# Patient Record
Sex: MALE | Race: WHITE | Employment: FULL TIME | URBAN - METROPOLITAN AREA
[De-identification: names, ages, dates, MRNs, and addresses within clinical notes are randomized per-mention and may not be internally consistent; named-entity substitution may affect disease eponyms.]

---

## 2017-01-16 DIAGNOSIS — IMO0001 UNCONTROLLED TYPE 2 DIABETES MELLITUS WITHOUT COMPLICATION, WITHOUT LONG-TERM CURRENT USE OF INSULIN: Primary | ICD-10-CM

## 2017-01-17 RX ORDER — GLIPIZIDE 5 MG/1
TABLET, FILM COATED, EXTENDED RELEASE ORAL
Qty: 90 TABLET | Refills: 0 | OUTPATIENT
Start: 2017-01-17

## 2017-01-26 DIAGNOSIS — IMO0001 UNCONTROLLED TYPE 2 DIABETES MELLITUS WITHOUT COMPLICATION, WITHOUT LONG-TERM CURRENT USE OF INSULIN: ICD-10-CM

## 2017-01-26 DIAGNOSIS — Z76.0 MEDICATION REFILL: Primary | ICD-10-CM

## 2017-01-26 DIAGNOSIS — F41.0 PANIC DISORDER WITHOUT AGORAPHOBIA: ICD-10-CM

## 2017-01-27 RX ORDER — BUSPIRONE HYDROCHLORIDE 5 MG/1
TABLET ORAL
Qty: 90 TABLET | Refills: 1 | Status: SHIPPED | OUTPATIENT
Start: 2017-01-27 | End: 2017-07-24

## 2017-01-27 RX ORDER — GLIPIZIDE 10 MG/1
TABLET ORAL
Qty: 180 TABLET | Refills: 1 | Status: SHIPPED | OUTPATIENT
Start: 2017-01-27 | End: 2017-07-24

## 2017-01-27 NOTE — TELEPHONE ENCOUNTER
Requesting GLIPIZIDE 10 MG TABLET & BUSPIRONE HCL 5 MG TABLET  LOV: 12/8/2016  Last Labs: Diabetic A1C: 12/3/2016 and future order pended. Filled: 8/4/2016 #180 with 1 refills for glipizide.               Buspirone medication filled: 8/4/16 #90 with 1 refi

## 2017-02-06 DIAGNOSIS — E78.1 HIGH TRIGLYCERIDES: Primary | ICD-10-CM

## 2017-02-06 RX ORDER — FENOFIBRATE 145 MG/1
TABLET, COATED ORAL
Qty: 90 TABLET | Refills: 0 | Status: SHIPPED | OUTPATIENT
Start: 2017-02-06 | End: 2017-05-08

## 2017-02-20 ENCOUNTER — MED REC SCAN ONLY (OUTPATIENT)
Dept: FAMILY MEDICINE CLINIC | Facility: CLINIC | Age: 42
End: 2017-02-20

## 2017-04-07 ENCOUNTER — OFFICE VISIT (OUTPATIENT)
Dept: FAMILY MEDICINE CLINIC | Facility: CLINIC | Age: 42
End: 2017-04-07

## 2017-04-07 VITALS
TEMPERATURE: 99 F | WEIGHT: 247 LBS | HEART RATE: 91 BPM | DIASTOLIC BLOOD PRESSURE: 76 MMHG | BODY MASS INDEX: 35.36 KG/M2 | OXYGEN SATURATION: 97 % | SYSTOLIC BLOOD PRESSURE: 106 MMHG | HEIGHT: 70 IN | RESPIRATION RATE: 18 BRPM

## 2017-04-07 DIAGNOSIS — J01.00 ACUTE MAXILLARY SINUSITIS, RECURRENCE NOT SPECIFIED: Primary | ICD-10-CM

## 2017-04-07 PROCEDURE — 99213 OFFICE O/P EST LOW 20 MIN: CPT | Performed by: NURSE PRACTITIONER

## 2017-04-07 RX ORDER — AMOXICILLIN AND CLAVULANATE POTASSIUM 875; 125 MG/1; MG/1
1 TABLET, FILM COATED ORAL 2 TIMES DAILY
Qty: 20 TABLET | Refills: 0 | Status: SHIPPED | OUTPATIENT
Start: 2017-04-07 | End: 2017-04-17

## 2017-04-07 NOTE — PROGRESS NOTES
CHIEF COMPLAINT:   Patient presents with:  Sinus Problem: RM17, x cough, chest congestion, x 2-3 days      HPI:   Tilton Rubinstein is a 35year old male who presents for cold symptoms for  3  days.  Symptoms have progressed into sinus congestion and been wor • Pure hyperglyceridemia    • Mole of skin      left back   • Head trauma      closed   • Cephalgia      post trauma   • Otalgia      left   • Family history of diabetes mellitus    • Obesity    • Hypertriglyceridemia    • Lipid screening 06/25/11   • Polly Cohen LUNGS: clear to auscultation bilaterally, no wheezes or rhonchi. Breathing is non labored. CARDIO: RRR without murmur  EXTREMITIES: no cyanosis, clubbing or edema  LYMPH:  + anterior cervical lymphadenopathy.         ASSESSMENT AND PLAN:   Мария Henderson Applying heat to the area surrounding your sinuses may make you feel more comfortable. Use a hot water bottle or a hand towel dipped in hot water. Some people also find ice packs effective for relieving pain.   Medicines  Your doctor may prescribe medicatio

## 2017-05-09 RX ORDER — FENOFIBRATE 145 MG/1
TABLET, COATED ORAL
Qty: 30 TABLET | Refills: 0 | Status: SHIPPED | OUTPATIENT
Start: 2017-05-09 | End: 2017-06-08

## 2017-05-09 NOTE — TELEPHONE ENCOUNTER
Please call pt and let him know his lipid panel is overdue. Lipid panel is placed in system. Will refill his medication for 30 days until he comes in for his June appt.           Future Appointments  Date Time Provider Coleman Taylor   6/8/2017 6:30 PM

## 2017-06-03 ENCOUNTER — APPOINTMENT (OUTPATIENT)
Dept: LAB | Age: 42
End: 2017-06-03
Attending: FAMILY MEDICINE
Payer: COMMERCIAL

## 2017-06-03 DIAGNOSIS — IMO0001 UNCONTROLLED TYPE 2 DIABETES MELLITUS WITHOUT COMPLICATION, WITHOUT LONG-TERM CURRENT USE OF INSULIN: ICD-10-CM

## 2017-06-03 DIAGNOSIS — E78.1 HIGH TRIGLYCERIDES: ICD-10-CM

## 2017-06-03 PROCEDURE — 36415 COLL VENOUS BLD VENIPUNCTURE: CPT | Performed by: FAMILY MEDICINE

## 2017-06-03 PROCEDURE — 80061 LIPID PANEL: CPT | Performed by: FAMILY MEDICINE

## 2017-06-03 PROCEDURE — 83036 HEMOGLOBIN GLYCOSYLATED A1C: CPT | Performed by: FAMILY MEDICINE

## 2017-06-06 NOTE — PROGRESS NOTES
Quick Note:    Pt has 6/8/17 office visit this week and his glyco is improved at 8.8%. Lipids improved as well.  Dr. Michael Quinn    ______

## 2017-06-08 ENCOUNTER — OFFICE VISIT (OUTPATIENT)
Dept: FAMILY MEDICINE CLINIC | Facility: CLINIC | Age: 42
End: 2017-06-08

## 2017-06-08 VITALS
SYSTOLIC BLOOD PRESSURE: 118 MMHG | RESPIRATION RATE: 16 BRPM | WEIGHT: 243 LBS | HEART RATE: 102 BPM | TEMPERATURE: 98 F | OXYGEN SATURATION: 99 % | BODY MASS INDEX: 34.79 KG/M2 | HEIGHT: 70 IN | DIASTOLIC BLOOD PRESSURE: 70 MMHG

## 2017-06-08 DIAGNOSIS — C67.9 MALIGNANT NEOPLASM OF URINARY BLADDER, UNSPECIFIED SITE (HCC): ICD-10-CM

## 2017-06-08 DIAGNOSIS — E78.2 MIXED DYSLIPIDEMIA: ICD-10-CM

## 2017-06-08 DIAGNOSIS — Z13.89 SCREENING FOR DIABETIC PERIPHERAL NEUROPATHY: ICD-10-CM

## 2017-06-08 DIAGNOSIS — IMO0001 UNCONTROLLED TYPE 2 DIABETES MELLITUS WITHOUT COMPLICATION, WITHOUT LONG-TERM CURRENT USE OF INSULIN: Primary | ICD-10-CM

## 2017-06-08 DIAGNOSIS — Z13.31 DEPRESSION SCREEN: ICD-10-CM

## 2017-06-08 DIAGNOSIS — Z71.3 DIETARY COUNSELING: ICD-10-CM

## 2017-06-08 DIAGNOSIS — F41.1 GENERALIZED ANXIETY DISORDER: ICD-10-CM

## 2017-06-08 DIAGNOSIS — R73.09 ELEVATED GLYCOHEMOGLOBIN: ICD-10-CM

## 2017-06-08 DIAGNOSIS — C67.4 MALIGNANT NEOPLASM OF POSTERIOR WALL OF URINARY BLADDER (HCC): ICD-10-CM

## 2017-06-08 PROCEDURE — 99214 OFFICE O/P EST MOD 30 MIN: CPT | Performed by: FAMILY MEDICINE

## 2017-06-08 RX ORDER — FENOFIBRATE 145 MG/1
145 TABLET, COATED ORAL
Qty: 90 TABLET | Refills: 1 | Status: SHIPPED | OUTPATIENT
Start: 2017-06-08 | End: 2017-11-15

## 2017-06-08 NOTE — PROGRESS NOTES
HPI:   Sadie Anna is a 35year old male who presents for recheck of his diabetes type II NIDDM, bladder cancer follow up, discussion of dyslipidemia from 6/3/17 labs, and need for med refill for Fenofibrate.    Patient’s FBS have been improved at 140s good today with less personal and less work stress. Wt Readings from Last 6 Encounters:  04/07/17 : 247 lb  12/15/16 : 245 lb  12/08/16 : 247 lb  10/11/16 : 246 lb  09/29/16 : 239 lb 6 oz  08/04/16 : 246 lb    Body mass index is 34.87 kg/(m^2).        L rhinitis, cause unspecified    • Panic disorder without agoraphobia    • Other acquired absence of organ    • Other and unspecified hyperlipidemia    • Pure hyperglyceridemia    • Seasonal allergies    • Bell's palsy      left   • Hematuria    • Left flank no apparent distress, athletic male   SKIN: no rashes,no suspicious lesions, tan   NECK: supple,no adenopathy,no bruits  LUNGS: clear to auscultation, exhalation smells like cigarette smoke   CARDIO: RRR without murmur  GI: good BS's,no masses, HSM or tend today with no deficiencies     Depression screen  PHQ2 is zero    Dietary counseling  Healthy DM low cholesterol diet reviewed     Mixed dyslipidemia  -     Fenofibrate 145 MG Oral Tab; Take 1 tablet (145 mg total) by mouth once daily.   Refill given    Lavinia

## 2017-06-08 NOTE — PATIENT INSTRUCTIONS
Diet: Diabetes  Food is an important tool that you can use to control diabetes and stay healthy. Eating well-balanced meals in the correct amounts will help you control your blood glucose levels and prevent low blood sugar reactions.  It will also help yo · Avoid added salt. It can contribute to high blood pressure, which can cause heart disease. People with diabetes already have a risk of high blood pressure and heart disease. · Stay at a healthy weight.  If you need to lose weight, cut down on your portio You will be asked about your overall health and any history of foot problems. You’ll also discuss your diabetes history, such as whether your blood sugar level has changed over time.  It also includes questions about sensations of pain, tingling, pins and n When you have diabetes, it’s easier to prevent problems than to treat them later on. So see your healthcare team for regular checkups and foot care. Your healthcare team can also help you learn more about caring for your feet at home.  For example, you may

## 2017-06-08 NOTE — PROGRESS NOTES
Quick Note:    Pt aware of elevated microalbumin level due to elevated glycohemoglobin.  Dr. Diego Madsen    ______

## 2017-06-27 DIAGNOSIS — Z76.0 MEDICATION REFILL: ICD-10-CM

## 2017-06-27 DIAGNOSIS — IMO0001 UNCONTROLLED TYPE 2 DIABETES MELLITUS WITHOUT COMPLICATION, WITHOUT LONG-TERM CURRENT USE OF INSULIN: ICD-10-CM

## 2017-06-27 NOTE — TELEPHONE ENCOUNTER
METFORMIN HCL 1,000 MG TABLET      Diabetic Medication Protocol Failed6/27 1:03 AM   Last HgBA1C < 7.5     Future Appointments  Date Time Provider Coleman Taylor   12/7/2017 6:30 PM Nathalia Sullivan,  [facility] [address] [facility] [address]     Medication pended if okay

## 2017-07-19 ENCOUNTER — OFFICE VISIT (OUTPATIENT)
Dept: FAMILY MEDICINE CLINIC | Facility: CLINIC | Age: 42
End: 2017-07-19

## 2017-07-19 VITALS
BODY MASS INDEX: 34 KG/M2 | WEIGHT: 235 LBS | HEART RATE: 90 BPM | RESPIRATION RATE: 16 BRPM | TEMPERATURE: 98 F | DIASTOLIC BLOOD PRESSURE: 72 MMHG | SYSTOLIC BLOOD PRESSURE: 128 MMHG | OXYGEN SATURATION: 96 %

## 2017-07-19 DIAGNOSIS — J01.00 ACUTE NON-RECURRENT MAXILLARY SINUSITIS: Primary | ICD-10-CM

## 2017-07-19 DIAGNOSIS — R06.2 WHEEZING: ICD-10-CM

## 2017-07-19 PROCEDURE — 99213 OFFICE O/P EST LOW 20 MIN: CPT | Performed by: NURSE PRACTITIONER

## 2017-07-19 RX ORDER — ALBUTEROL SULFATE 90 UG/1
2 AEROSOL, METERED RESPIRATORY (INHALATION) EVERY 4 HOURS PRN
Qty: 1 INHALER | Refills: 1 | Status: SHIPPED | OUTPATIENT
Start: 2017-07-19 | End: 2018-11-26

## 2017-07-19 RX ORDER — AMOXICILLIN AND CLAVULANATE POTASSIUM 875; 125 MG/1; MG/1
1 TABLET, FILM COATED ORAL 2 TIMES DAILY
Qty: 20 TABLET | Refills: 0 | Status: SHIPPED | OUTPATIENT
Start: 2017-07-19 | End: 2017-07-29

## 2017-07-19 RX ORDER — CODEINE PHOSPHATE AND GUAIFENESIN 10; 100 MG/5ML; MG/5ML
10 SOLUTION ORAL NIGHTLY PRN
Qty: 120 ML | Refills: 0 | Status: SHIPPED | OUTPATIENT
Start: 2017-07-19 | End: 2017-12-07

## 2017-07-19 NOTE — PATIENT INSTRUCTIONS
Use Claritin 10mg daily, ongoing  Use the medications prescribed today      When to Use Antibiotics   Antibiotics are medicines used to treat infections caused by bacteria. They don’t work for illnesses caused by viruses or an allergic reaction.  In fact, t · Most sinus infections (sinusitis). This kind of infection causes sinus pain and swelling, and a runny nose. In most cases, sinusitis goes away on its own, and antibiotics don’t make recovery quicker. · Allergic rhinitis.  This is a set of symptoms caused · Use over-the-counter medicine such as acetaminophen to ease pain or fever, as directed by your healthcare provider.   To treat sinus pain or nasal congestion:   · Put a warm, moist washcloth on your face where you feel sinus pain or pressure.   · Use a na Air circulates freely though healthy sinuses. Tiny, hairlike structures called cilia line the sinuses. Cilia move the thin, watery mucus through the sinuses and into the nose. Sinuses are healthy when they drain freely.  Sinus drainage can be blocked if the

## 2017-07-19 NOTE — PROGRESS NOTES
CHIEF COMPLAINT:   Patient presents with:  Nasal Congestion: coughing, sinus pressure, H/A -- sxs for over 1 week      HPI:   Vincent Moran is a 35year old male who presents for upper respiratory symptoms for  7 days.  Patient reports sore throat only at • Renal lithiasis    • Seasonal allergies    • Sebaceous cyst    • Type II or unspecified type diabetes mellitus without mention of complication, not stated as uncontrolled    • Weight loss       Past Surgical History:  2004: CHOLECYSTECTOMY  No date: ORAL PLAN: Meds as below.   Comfort care as described in Patient Instructions    Meds & Refills for this Visit:    Signed Prescriptions Disp Refills    Amoxicillin-Pot Clavulanate 875-125 MG Oral Tab 20 tablet 0      Sig: Take 1 tablet by mouth 2 (two) times ivy · Bronchitis. This is an infection in the lungs most often caused by a virus. You may have coughing, phlegm, body aches, and a low fever. A common type of bronchitis is known as a chest cold (acute bronchitis).  This often happens after you have a respirato · Some ear infections. In some cases, a healthcare provider may prescribe antibiotics for an ear infection. You may need a test to show what’s causing the ear infection. · Some sinus infections.  In some cases, yourhealthcare provider may give you antibiot Your sinuses are air-filled spaces between the bones in your head. They have small openings that connect to the nasal cavity. The sinuses make mucus that drains into the nose.  This helps keep the nose moist and free of dust and germs.            Parts of t

## 2017-07-24 DIAGNOSIS — IMO0001 UNCONTROLLED TYPE 2 DIABETES MELLITUS WITHOUT COMPLICATION, WITHOUT LONG-TERM CURRENT USE OF INSULIN: Primary | ICD-10-CM

## 2017-07-24 DIAGNOSIS — F41.0 PANIC DISORDER WITHOUT AGORAPHOBIA: ICD-10-CM

## 2017-07-24 DIAGNOSIS — Z76.0 MEDICATION REFILL: ICD-10-CM

## 2017-07-24 RX ORDER — BUSPIRONE HYDROCHLORIDE 5 MG/1
TABLET ORAL
Qty: 90 TABLET | Refills: 1 | Status: SHIPPED | OUTPATIENT
Start: 2017-07-24 | End: 2018-01-20

## 2017-07-24 RX ORDER — GLIPIZIDE 10 MG/1
TABLET ORAL
Qty: 180 TABLET | Refills: 1 | Status: SHIPPED | OUTPATIENT
Start: 2017-07-24 | End: 2018-01-20

## 2017-07-24 NOTE — TELEPHONE ENCOUNTER
GLIPIZIDE 10 MG TABLET  In chart as: GLIPIZIDE 10 MG Oral Tab  TAKE 1 TABLET (10 MG TOTAL) BY MOUTH 2 (TWO) TIMES DAILY BEFORE MEALS.        Disp: 180 tablet Refills: 1    Class: Normal Start: 7/24/2017   For: Medication refill; Uncontrolled type 2 diabetes

## 2017-09-25 ENCOUNTER — MED REC SCAN ONLY (OUTPATIENT)
Dept: FAMILY MEDICINE CLINIC | Facility: CLINIC | Age: 42
End: 2017-09-25

## 2017-11-15 ENCOUNTER — TELEPHONE (OUTPATIENT)
Dept: FAMILY MEDICINE CLINIC | Facility: CLINIC | Age: 42
End: 2017-11-15

## 2017-11-15 DIAGNOSIS — IMO0001 UNCONTROLLED TYPE 2 DIABETES MELLITUS WITHOUT COMPLICATION, WITHOUT LONG-TERM CURRENT USE OF INSULIN: ICD-10-CM

## 2017-11-15 DIAGNOSIS — Z76.0 MEDICATION REFILL: ICD-10-CM

## 2017-11-15 DIAGNOSIS — E78.2 MIXED DYSLIPIDEMIA: ICD-10-CM

## 2017-11-15 RX ORDER — FENOFIBRATE 145 MG/1
145 TABLET, COATED ORAL
Qty: 90 TABLET | Refills: 0 | Status: SHIPPED | OUTPATIENT
Start: 2017-11-15 | End: 2018-03-12

## 2017-11-15 NOTE — TELEPHONE ENCOUNTER
METFORMIN HCL 1,000 MG TABLET  Will file in chart as: METFORMIN HCL 1000 MG Oral Tab  TAKE 1 TABLET TWICE A DAY WITH FOOD       Disp: 60 tablet Refills: 1    Class: Normal Start: 11/15/2017   For: Uncontrolled type 2 diabetes mellitus without complication,

## 2017-11-16 ENCOUNTER — OFFICE VISIT (OUTPATIENT)
Dept: FAMILY MEDICINE CLINIC | Facility: CLINIC | Age: 42
End: 2017-11-16

## 2017-11-16 VITALS
TEMPERATURE: 98 F | HEART RATE: 104 BPM | OXYGEN SATURATION: 98 % | DIASTOLIC BLOOD PRESSURE: 76 MMHG | SYSTOLIC BLOOD PRESSURE: 120 MMHG | WEIGHT: 240 LBS | BODY MASS INDEX: 34 KG/M2 | RESPIRATION RATE: 16 BRPM

## 2017-11-16 DIAGNOSIS — F17.200 TOBACCO USE DISORDER: ICD-10-CM

## 2017-11-16 DIAGNOSIS — IMO0001 UNCONTROLLED TYPE 2 DIABETES MELLITUS WITHOUT COMPLICATION, WITHOUT LONG-TERM CURRENT USE OF INSULIN: Primary | ICD-10-CM

## 2017-11-16 DIAGNOSIS — J01.00 ACUTE MAXILLARY SINUSITIS, RECURRENCE NOT SPECIFIED: ICD-10-CM

## 2017-11-16 PROCEDURE — 99213 OFFICE O/P EST LOW 20 MIN: CPT | Performed by: NURSE PRACTITIONER

## 2017-11-16 RX ORDER — ALBUTEROL SULFATE 90 UG/1
2 AEROSOL, METERED RESPIRATORY (INHALATION) EVERY 6 HOURS PRN
Qty: 1 INHALER | Refills: 0 | Status: SHIPPED | OUTPATIENT
Start: 2017-11-16 | End: 2017-12-07

## 2017-11-16 RX ORDER — CODEINE PHOSPHATE AND GUAIFENESIN 10; 100 MG/5ML; MG/5ML
SOLUTION ORAL
Qty: 100 ML | Refills: 0 | Status: SHIPPED | OUTPATIENT
Start: 2017-11-16 | End: 2017-12-07

## 2017-11-16 RX ORDER — AMOXICILLIN AND CLAVULANATE POTASSIUM 875; 125 MG/1; MG/1
1 TABLET, FILM COATED ORAL 2 TIMES DAILY
Qty: 20 TABLET | Refills: 0 | Status: SHIPPED | OUTPATIENT
Start: 2017-11-16 | End: 2017-11-26

## 2017-11-17 NOTE — PROGRESS NOTES
CHIEF COMPLAINT:   Patient presents with:  Nasal Congestion: x 6 days, coughing, chest hurts, short of breath       HPI:   Cailin Donald is a 38year old male who presents for upper respiratory symptoms for  6 days.  Patient reports congestion, yellow col Allergic rhinitis, cause unspecified    • Bell's palsy     left   • Bladder cancer [facility])    • Calculus of kidney    • Cancer ([facility] [address]    • Cephalgia     post trauma   • Esophageal reflux    • Family history of diabetes mellitus    • Fatigue    • Generalized a frontal sinuses.   EYES: conjunctiva clear, EOM intact  EARS: TM's clear, no bulging, no retraction,moderate clear right fluid, bony landmarks visible  NOSE: Nostrils patent, thick nasal discharge, nasal mucosa pink and swollen nasal turbinates  THROAT: Malissa Panning

## 2017-11-26 ENCOUNTER — APPOINTMENT (OUTPATIENT)
Dept: CT IMAGING | Age: 42
End: 2017-11-26
Attending: EMERGENCY MEDICINE
Payer: COMMERCIAL

## 2017-11-26 ENCOUNTER — HOSPITAL ENCOUNTER (EMERGENCY)
Age: 42
Discharge: HOME | End: 2017-11-26
Attending: EMERGENCY MEDICINE
Payer: COMMERCIAL

## 2017-11-26 VITALS
HEART RATE: 80 BPM | OXYGEN SATURATION: 98 % | TEMPERATURE: 98 F | BODY MASS INDEX: 34.36 KG/M2 | RESPIRATION RATE: 18 BRPM | SYSTOLIC BLOOD PRESSURE: 134 MMHG | DIASTOLIC BLOOD PRESSURE: 67 MMHG | WEIGHT: 240 LBS | HEIGHT: 70 IN

## 2017-11-26 DIAGNOSIS — K57.92 ACUTE DIVERTICULITIS: Primary | ICD-10-CM

## 2017-11-26 PROCEDURE — 96374 THER/PROPH/DIAG INJ IV PUSH: CPT

## 2017-11-26 PROCEDURE — 83690 ASSAY OF LIPASE: CPT

## 2017-11-26 PROCEDURE — 83690 ASSAY OF LIPASE: CPT | Performed by: EMERGENCY MEDICINE

## 2017-11-26 PROCEDURE — 81003 URINALYSIS AUTO W/O SCOPE: CPT | Performed by: EMERGENCY MEDICINE

## 2017-11-26 PROCEDURE — 80053 COMPREHEN METABOLIC PANEL: CPT | Performed by: EMERGENCY MEDICINE

## 2017-11-26 PROCEDURE — 99284 EMERGENCY DEPT VISIT MOD MDM: CPT

## 2017-11-26 PROCEDURE — 85025 COMPLETE CBC W/AUTO DIFF WBC: CPT | Performed by: EMERGENCY MEDICINE

## 2017-11-26 PROCEDURE — 74177 CT ABD & PELVIS W/CONTRAST: CPT | Performed by: EMERGENCY MEDICINE

## 2017-11-26 PROCEDURE — 85025 COMPLETE CBC W/AUTO DIFF WBC: CPT

## 2017-11-26 PROCEDURE — 80053 COMPREHEN METABOLIC PANEL: CPT

## 2017-11-26 PROCEDURE — 96361 HYDRATE IV INFUSION ADD-ON: CPT

## 2017-11-26 RX ORDER — LEVOFLOXACIN 500 MG/1
500 TABLET, FILM COATED ORAL ONCE
Status: COMPLETED | OUTPATIENT
Start: 2017-11-26 | End: 2017-11-26

## 2017-11-26 RX ORDER — KETOROLAC TROMETHAMINE 30 MG/ML
30 INJECTION, SOLUTION INTRAMUSCULAR; INTRAVENOUS ONCE
Status: COMPLETED | OUTPATIENT
Start: 2017-11-26 | End: 2017-11-26

## 2017-11-26 RX ORDER — HYDROCODONE BITARTRATE AND ACETAMINOPHEN 5; 325 MG/1; MG/1
1-2 TABLET ORAL EVERY 4 HOURS PRN
Qty: 20 TABLET | Refills: 0 | Status: SHIPPED | OUTPATIENT
Start: 2017-11-26 | End: 2017-12-03

## 2017-11-26 RX ORDER — CIPROFLOXACIN 500 MG/1
500 TABLET, FILM COATED ORAL 2 TIMES DAILY
Qty: 20 TABLET | Refills: 0 | Status: SHIPPED | OUTPATIENT
Start: 2017-11-26 | End: 2017-12-06

## 2017-11-26 RX ORDER — METRONIDAZOLE 500 MG/1
500 TABLET ORAL 3 TIMES DAILY
Qty: 30 TABLET | Refills: 0 | Status: SHIPPED | OUTPATIENT
Start: 2017-11-26 | End: 2017-12-06

## 2017-11-26 RX ORDER — SODIUM CHLORIDE 9 MG/ML
INJECTION, SOLUTION INTRAVENOUS CONTINUOUS
Status: DISCONTINUED | OUTPATIENT
Start: 2017-11-26 | End: 2017-11-27

## 2017-11-26 RX ORDER — METRONIDAZOLE 500 MG/1
500 TABLET ORAL ONCE
Status: COMPLETED | OUTPATIENT
Start: 2017-11-26 | End: 2017-11-26

## 2017-11-27 NOTE — ED INITIAL ASSESSMENT (HPI)
STS LLQ PAIN SINCE YEST. STS WORSENING SINCE ONSET. PT STS IDENTICAL PAIN WHEN HE WAS DIAGNOSED WITH DIVERTICULITIS. PT DENIES N/V/D.

## 2017-11-27 NOTE — ED PROVIDER NOTES
Patient Seen in: [facility] Emergency Department In [place]    History   Patient presents with:  Abdomen/Flank Pain (GI/)    Stated Complaint: ABD PAIN     HPI    15-year-old male with a history of diverticulitis, history of bladder cancer, history of a Review of Systems    Positive for stated complaint: ABD PAIN   Other systems are as noted in HPI. Constitutional and vital signs reviewed. All other systems reviewed and negative except as noted above.     Physical Exam   ED Triage Vitals [11/26 3960  ------------------------------------------------------------   Patient was brought back to the examination room immediately. The patient was then placed on a cardiac monitor and pulse oximetry was applied.   Patient had an IV established and labs wer Disp-20 tablet, R-0    metRONIDAZOLE 500 MG Oral Tab  Take 1 tablet (500 mg total) by mouth 3 (three) times daily. , Print Script, Disp-30 tablet, R-0    HYDROcodone-acetaminophen 5-325 MG Oral Tab  Take 1-2 tablets by mouth every 4 (four) hours as needed f

## 2017-12-02 ENCOUNTER — LAB ENCOUNTER (OUTPATIENT)
Dept: LAB | Age: 42
End: 2017-12-02
Attending: FAMILY MEDICINE
Payer: COMMERCIAL

## 2017-12-02 DIAGNOSIS — IMO0001 UNCONTROLLED TYPE 2 DIABETES MELLITUS WITHOUT COMPLICATION, WITHOUT LONG-TERM CURRENT USE OF INSULIN: ICD-10-CM

## 2017-12-02 PROCEDURE — 36415 COLL VENOUS BLD VENIPUNCTURE: CPT | Performed by: FAMILY MEDICINE

## 2017-12-02 PROCEDURE — 83036 HEMOGLOBIN GLYCOSYLATED A1C: CPT | Performed by: FAMILY MEDICINE

## 2017-12-03 NOTE — PROGRESS NOTES
Improved glyco down to 8.3% and pt has DM office appt this week. Will discuss with him then.  Dr. Saintclair Sato

## 2017-12-07 ENCOUNTER — OFFICE VISIT (OUTPATIENT)
Dept: FAMILY MEDICINE CLINIC | Facility: CLINIC | Age: 42
End: 2017-12-07

## 2017-12-07 VITALS
DIASTOLIC BLOOD PRESSURE: 60 MMHG | WEIGHT: 234.25 LBS | RESPIRATION RATE: 16 BRPM | HEIGHT: 70 IN | TEMPERATURE: 98 F | SYSTOLIC BLOOD PRESSURE: 140 MMHG | HEART RATE: 102 BPM | BODY MASS INDEX: 33.53 KG/M2

## 2017-12-07 DIAGNOSIS — Z09 FOLLOW UP: ICD-10-CM

## 2017-12-07 DIAGNOSIS — F17.200 HIGH DEPENDENCE ON SMOKING: ICD-10-CM

## 2017-12-07 DIAGNOSIS — Z78.9 CAFFEINE USE: ICD-10-CM

## 2017-12-07 DIAGNOSIS — K57.30 DIVERTICULOSIS OF LARGE INTESTINE WITHOUT HEMORRHAGE: ICD-10-CM

## 2017-12-07 DIAGNOSIS — R63.4 WEIGHT LOSS: ICD-10-CM

## 2017-12-07 DIAGNOSIS — C67.9 MALIGNANT NEOPLASM OF URINARY BLADDER, UNSPECIFIED SITE (HCC): ICD-10-CM

## 2017-12-07 DIAGNOSIS — Z12.11 COLON CANCER SCREENING: ICD-10-CM

## 2017-12-07 DIAGNOSIS — IMO0001 UNCONTROLLED TYPE 2 DIABETES MELLITUS WITHOUT COMPLICATION, WITHOUT LONG-TERM CURRENT USE OF INSULIN: Primary | ICD-10-CM

## 2017-12-07 PROCEDURE — 99215 OFFICE O/P EST HI 40 MIN: CPT | Performed by: FAMILY MEDICINE

## 2017-12-08 NOTE — PATIENT INSTRUCTIONS
Jesse Sober you were seen for DM and diverticular disease. Watch diet. Your foot exam is normal.   Cut back on smoking. See your specialists for management. See Dr. Digna Wilson from GI in the next few months for scopes after the diverticular disease.   Happy Holida If needed, your healthcare provider will suggest certain tests to learn more about your feet. These include:  · Doppler tests to measure blood flow in the feet and lower leg. · Fredi Plum can show bone or joint problems.   · Other imaging tests, such as The more you know about diabetes and your feet, the easier it will be to prevent problems. Members of your healthcare team can teach you how to inspect your feet and teach you to look for warning signs. They can also give you other foot care tips.  During o Treatment depends on how bad your symptoms are. · For mild symptoms. You may be put on a liquid diet for a short time. Antibiotics are usually prescribed. If these two steps relieve your symptoms, you may then be prescribed a high-fiber diet.  If you still

## 2017-12-08 NOTE — PROGRESS NOTES
HPI:   Abigail Perez is a 38year old male here for management of his uncontrolled non-insulin requiring type II diabetes and bladder cancer managed by urology. Dr. Quentin Gao. He was last seen on 9/25/17 by urology.   Pt has been drinking more sodas again a Results  Component Value Date   A1C 8.3 (H) 12/02/2017   A1C 8.8 (H) 06/03/2017   A1C 9.3 (H) 12/03/2016       Lab Results  Component Value Date   CHOLEST 143 06/03/2017   CHOLEST 156 01/23/2016   CHOLEST 176 07/25/2015       Lab Results  Component Value D Family history of diabetes mellitus    • Fatigue    • Generalized anxiety disorder 8/28/2010   • Gross hematuria    • Head trauma     closed   • Hematuria    • Hypertriglyceridemia    • Left flank pain    • Lipid screening 06/25/11   • Melanocytic nevus lesions  NECK: supple,no adenopathy,no bruits  LUNGS: clear to auscultation, breath of cigarette smoke  CARDIO: RRR without murmur  GI: good BS's,no masses, HSM; mild left lower quadrant tenderness to deep palpation   EXTREMITIES: no cyanosis, clubbing or address recent ER follow up from recent diverticulitis flare in patient education, care planning and in review and explanation of testing done by labs and imaging in the past month. Pt agrees to extended visit today.       Diagnoses and all orders for this

## 2017-12-18 PROBLEM — Z78.9 CAFFEINE USE: Status: ACTIVE | Noted: 2017-12-18

## 2017-12-18 PROBLEM — F17.200 HIGH DEPENDENCE ON SMOKING: Status: ACTIVE | Noted: 2017-12-18

## 2018-01-16 DIAGNOSIS — IMO0001 UNCONTROLLED TYPE 2 DIABETES MELLITUS WITHOUT COMPLICATION, WITHOUT LONG-TERM CURRENT USE OF INSULIN: Primary | ICD-10-CM

## 2018-01-16 DIAGNOSIS — Z76.0 MEDICATION REFILL: ICD-10-CM

## 2018-01-16 NOTE — TELEPHONE ENCOUNTER
METFORMIN HCL 1,000 MG TABLET  Will file in chart as: METFORMIN HCL 1000 MG Oral Tab  TAKE 1 TABLET TWICE A DAY WITH FOOD       Disp: 60 tablet Refills: 1    Class: Normal Start: 1/16/2018   For: Uncontrolled type 2 diabetes mellitus without complication,

## 2018-01-20 DIAGNOSIS — F41.0 PANIC DISORDER WITHOUT AGORAPHOBIA: ICD-10-CM

## 2018-01-20 DIAGNOSIS — Z76.0 MEDICATION REFILL: ICD-10-CM

## 2018-01-20 DIAGNOSIS — IMO0001 UNCONTROLLED TYPE 2 DIABETES MELLITUS WITHOUT COMPLICATION, WITHOUT LONG-TERM CURRENT USE OF INSULIN: ICD-10-CM

## 2018-01-22 RX ORDER — BUSPIRONE HYDROCHLORIDE 5 MG/1
TABLET ORAL
Qty: 90 TABLET | Refills: 1 | Status: SHIPPED | OUTPATIENT
Start: 2018-01-22 | End: 2018-11-26

## 2018-01-22 RX ORDER — GLIPIZIDE 10 MG/1
TABLET ORAL
Qty: 180 TABLET | Refills: 1 | Status: SHIPPED | OUTPATIENT
Start: 2018-01-22 | End: 2018-11-26

## 2018-01-22 NOTE — TELEPHONE ENCOUNTER
GLIPIZIDE 10 MG TABLET  Will file in chart as: GLIPIZIDE 10 MG Oral Tab  TAKE 1 TABLET (10 MG TOTAL) BY MOUTH 2 (TWO) TIMES DAILY BEFORE MEALS.        Disp: 180 tablet Refills: 1    Class: Normal Start: 1/20/2018   For: Medication refill; Uncontrolled type

## 2018-02-02 ENCOUNTER — MED REC SCAN ONLY (OUTPATIENT)
Dept: FAMILY MEDICINE CLINIC | Facility: CLINIC | Age: 43
End: 2018-02-02

## 2018-03-10 DIAGNOSIS — E78.2 MIXED DYSLIPIDEMIA: ICD-10-CM

## 2018-03-12 RX ORDER — FENOFIBRATE 145 MG/1
145 TABLET, COATED ORAL
Qty: 90 TABLET | Refills: 0 | Status: SHIPPED | OUTPATIENT
Start: 2018-03-12 | End: 2018-06-12

## 2018-03-12 NOTE — TELEPHONE ENCOUNTER
Cholesterol Medication Protocol Passed3/12 7:20 AM   ALT < 80    ALT resulted within past year    Lipid panel within past 12 months    Appointment within past 12 or next 3 months     Requesting fenofibrate 145mg  LOV: 12/7/17 ([facility])  RTC: 6 months  Last

## 2018-06-12 DIAGNOSIS — E78.2 MIXED DYSLIPIDEMIA: ICD-10-CM

## 2018-06-12 RX ORDER — FENOFIBRATE 145 MG/1
145 TABLET, COATED ORAL
Qty: 30 TABLET | Refills: 0 | Status: SHIPPED | OUTPATIENT
Start: 2018-06-12 | End: 2018-11-26

## 2018-06-12 NOTE — TELEPHONE ENCOUNTER
Cholesterol Medication Protocol Failed6/12 9:31 AM   Lipid panel within past 12 months    ALT < 80    ALT resulted within past year    Appointment within past 12 or next 3 months     Requesting fenofibrate 145mg  LOV: 12/7/17   RTC: 6 months  Last Relevant

## 2018-07-03 ENCOUNTER — TELEPHONE (OUTPATIENT)
Dept: FAMILY MEDICINE CLINIC | Facility: CLINIC | Age: 43
End: 2018-07-03

## 2018-07-03 NOTE — TELEPHONE ENCOUNTER
Received signed release form. Processed, prepared and faxed for scan stat.  Medical records asking to be sent to Dr Yovani Chavarria, [facility], [place] [address] [address] [facility], [id number]

## 2018-08-17 ENCOUNTER — OFFICE VISIT (OUTPATIENT)
Dept: FAMILY MEDICINE CLINIC | Facility: CLINIC | Age: 43
End: 2018-08-17
Payer: COMMERCIAL

## 2018-08-17 VITALS
OXYGEN SATURATION: 97 % | HEIGHT: 70 IN | HEART RATE: 102 BPM | RESPIRATION RATE: 16 BRPM | DIASTOLIC BLOOD PRESSURE: 82 MMHG | WEIGHT: 230 LBS | SYSTOLIC BLOOD PRESSURE: 144 MMHG | TEMPERATURE: 98 F | BODY MASS INDEX: 32.93 KG/M2

## 2018-08-17 DIAGNOSIS — J01.00 ACUTE NON-RECURRENT MAXILLARY SINUSITIS: Primary | ICD-10-CM

## 2018-08-17 DIAGNOSIS — R03.0 ELEVATED BLOOD PRESSURE READING: ICD-10-CM

## 2018-08-17 PROCEDURE — 99213 OFFICE O/P EST LOW 20 MIN: CPT | Performed by: NURSE PRACTITIONER

## 2018-08-17 RX ORDER — AMOXICILLIN AND CLAVULANATE POTASSIUM 875; 125 MG/1; MG/1
1 TABLET, FILM COATED ORAL 2 TIMES DAILY
Qty: 20 TABLET | Refills: 0 | Status: SHIPPED | OUTPATIENT
Start: 2018-08-17 | End: 2018-08-27

## 2018-08-17 RX ORDER — FLUTICASONE PROPIONATE 50 MCG
2 SPRAY, SUSPENSION (ML) NASAL DAILY
Qty: 1 BOTTLE | Refills: 0 | Status: SHIPPED | OUTPATIENT
Start: 2018-08-17 | End: 2018-11-26

## 2018-08-18 NOTE — PROGRESS NOTES
CHIEF COMPLAINT:   Patient presents with:  Cough: chest hurts, sinus congestion/pressure, ears popping       HPI:   Tianna Del Rosario is a 38year old male who presents for cold symptoms for  2  weeks.  Symptoms have progressed into sinus congestion and been • Blood in urine December 2015    Bladder Issue   • Calculus of kidney    • Cancer [facility])    • Cephalgia     post trauma   • Diabetes mellitus ([facility] [address] January 2013    Type II   • Esophageal reflux    • Family history of diabetes mellitus    • Fatigue    • Gen CARDIOVASCULAR: denies chest pain or palpitations   GI: denies N/V/C or abdominal pain  NEURO: + sinus headaches. No numbness or tingling in face.     EXAM:   /80   Pulse 102   Temp 98.2 °F (36.8 °C) (Oral)   Resp 16   Ht 70\"   Wt 230 lb   SpO2 97% Risks, benefits, side effects of medication addressed and explained. Patient Instructions     Sinusitis (Antibiotic Treatment)    The sinuses are air-filled spaces within the bones of the face.  They connect to the inside of the nose. Sinusitis is an inf · Over-the-counter antihistamines may help if allergies contributed to your sinusitis.    · Do not use nasal rinses or irrigation during an acute sinus infection, unless your healthcare provider tells you to.  Rinsing may spread the infection to other areas

## 2018-10-04 ENCOUNTER — CHARTING TRANS (OUTPATIENT)
Dept: OTHER | Age: 43
End: 2018-10-04

## 2018-11-26 ENCOUNTER — OFFICE VISIT (OUTPATIENT)
Dept: FAMILY MEDICINE CLINIC | Facility: CLINIC | Age: 43
End: 2018-11-26
Payer: COMMERCIAL

## 2018-11-26 VITALS
WEIGHT: 232 LBS | SYSTOLIC BLOOD PRESSURE: 130 MMHG | BODY MASS INDEX: 33 KG/M2 | HEART RATE: 96 BPM | DIASTOLIC BLOOD PRESSURE: 82 MMHG

## 2018-11-26 DIAGNOSIS — F17.210 CIGARETTE SMOKER: ICD-10-CM

## 2018-11-26 DIAGNOSIS — L03.011 PARONYCHIA OF FINGER OF RIGHT HAND: ICD-10-CM

## 2018-11-26 DIAGNOSIS — B35.3 TINEA PEDIS OF BOTH FEET: ICD-10-CM

## 2018-11-26 DIAGNOSIS — E11.65 TYPE 2 DIABETES MELLITUS WITH HYPERGLYCEMIA, WITHOUT LONG-TERM CURRENT USE OF INSULIN (HCC): Primary | ICD-10-CM

## 2018-11-26 DIAGNOSIS — Z12.5 SCREENING FOR MALIGNANT NEOPLASM OF PROSTATE: ICD-10-CM

## 2018-11-26 DIAGNOSIS — E78.2 MIXED DYSLIPIDEMIA: ICD-10-CM

## 2018-11-26 DIAGNOSIS — N52.9 ERECTILE DYSFUNCTION, UNSPECIFIED ERECTILE DYSFUNCTION TYPE: ICD-10-CM

## 2018-11-26 DIAGNOSIS — F41.1 GENERALIZED ANXIETY DISORDER: ICD-10-CM

## 2018-11-26 PROCEDURE — 99215 OFFICE O/P EST HI 40 MIN: CPT | Performed by: FAMILY MEDICINE

## 2018-11-26 PROCEDURE — 93000 ELECTROCARDIOGRAM COMPLETE: CPT | Performed by: FAMILY MEDICINE

## 2018-11-26 RX ORDER — BUSPIRONE HYDROCHLORIDE 5 MG/1
5 TABLET ORAL 3 TIMES DAILY
COMMUNITY
End: 2018-11-26

## 2018-11-26 RX ORDER — GLIPIZIDE 10 MG/1
10 TABLET ORAL
Qty: 60 TABLET | Refills: 1 | Status: SHIPPED | OUTPATIENT
Start: 2018-11-26 | End: 2019-04-03

## 2018-11-26 RX ORDER — SULFAMETHOXAZOLE AND TRIMETHOPRIM 800; 160 MG/1; MG/1
1 TABLET ORAL 2 TIMES DAILY
Qty: 20 TABLET | Refills: 0 | Status: SHIPPED | OUTPATIENT
Start: 2018-11-26 | End: 2018-12-06

## 2018-11-26 RX ORDER — SILDENAFIL 100 MG/1
TABLET, FILM COATED ORAL
Qty: 10 TABLET | Refills: 5 | Status: SHIPPED | OUTPATIENT
Start: 2018-11-26 | End: 2019-11-13

## 2018-11-26 RX ORDER — LISINOPRIL 5 MG/1
5 TABLET ORAL NIGHTLY
Qty: 30 TABLET | Refills: 1 | Status: SHIPPED | OUTPATIENT
Start: 2018-11-26 | End: 2019-01-26

## 2018-11-26 RX ORDER — FENOFIBRATE 145 MG/1
145 TABLET, COATED ORAL DAILY
Qty: 30 TABLET | Refills: 1 | Status: SHIPPED | OUTPATIENT
Start: 2018-11-26 | End: 2018-12-12 | Stop reason: DRUGHIGH

## 2018-11-26 RX ORDER — BUSPIRONE HYDROCHLORIDE 5 MG/1
5 TABLET ORAL EVERY MORNING
Qty: 90 TABLET | Refills: 1 | Status: SHIPPED | OUTPATIENT
Start: 2018-11-26 | End: 2019-01-22

## 2018-11-27 NOTE — PATIENT INSTRUCTIONS
Athlete’s Foot    Athlete’s foot (tinea pedis) is caused by a fungal infection in the skin. It affects the skin between the toes, causing cracks in the skin called fissures.  It can also affect the bottom of the foot where it causes dry white scales and p · Increasing redness or swelling of the foot  · Infection comes back soon after treatment  · Pus draining from cracks in the skin  Date Last Reviewed: 8/1/2016  © 3908-7494 [employer]. [address] [facility] [place], [address] [place] [address].  All rights re · Don't suck on your thumbs or fingers. Follow-up care  Follow up with your healthcare provider, or as advised.   When to seek medical advice  Call your healthcare provider right away if any of these occur:  · Redness, pain, or swelling of the finger or to

## 2018-12-01 ENCOUNTER — LAB ENCOUNTER (OUTPATIENT)
Dept: LAB | Age: 43
End: 2018-12-01
Attending: FAMILY MEDICINE
Payer: COMMERCIAL

## 2018-12-01 DIAGNOSIS — E11.65 TYPE 2 DIABETES MELLITUS WITH HYPERGLYCEMIA, WITHOUT LONG-TERM CURRENT USE OF INSULIN (HCC): ICD-10-CM

## 2018-12-01 DIAGNOSIS — E78.2 MIXED DYSLIPIDEMIA: ICD-10-CM

## 2018-12-01 DIAGNOSIS — Z12.5 SCREENING FOR MALIGNANT NEOPLASM OF PROSTATE: ICD-10-CM

## 2018-12-01 PROCEDURE — 80061 LIPID PANEL: CPT | Performed by: FAMILY MEDICINE

## 2018-12-01 PROCEDURE — 83036 HEMOGLOBIN GLYCOSYLATED A1C: CPT | Performed by: FAMILY MEDICINE

## 2018-12-01 PROCEDURE — 80050 GENERAL HEALTH PANEL: CPT | Performed by: FAMILY MEDICINE

## 2018-12-01 PROCEDURE — 84153 ASSAY OF PSA TOTAL: CPT | Performed by: FAMILY MEDICINE

## 2018-12-01 PROCEDURE — 36415 COLL VENOUS BLD VENIPUNCTURE: CPT | Performed by: FAMILY MEDICINE

## 2018-12-01 PROCEDURE — 84439 ASSAY OF FREE THYROXINE: CPT | Performed by: FAMILY MEDICINE

## 2018-12-04 NOTE — PROGRESS NOTES
HPI:   Rosibel Loyola is a 37year old male    Type 2 diabetes mellitus:  Has been uncontrolled. Patient taking glipizide and metformin, denies side effects to medications. Denies hypoglycemia. Denies applications to his diabetes.   Patient is not on an DIABETES Disp: 100 strip Rfl: 3        Vicodin Tuss [Hydro*        Comment:vomiting   Past Medical History:   Diagnosis Date   • Allergic rhinitis, cause unspecified    • Anxiety January 2000   • Back pain January 2014    non chronic   • Bell's palsy     l headaches  ENDO:  Denies polyuria/polydypsia/heat or cold intolerance  PSYCH:  Denies depression    EXAM:   /82 (BP Location: Left arm, Patient Position: Sitting, Cuff Size: adult)   Pulse 96   Wt 232 lb   BMI 33.29 kg/m²   GENERAL: well developed, w Sienna Pineda is a 37year old male     Type 2 diabetes mellitus with hyperglycemia, without long-term current use of insulin (hcc)  (primary encounter diagnosis)  Mixed dyslipidemia  Paronychia of finger of right hand  Tinea pedis of both feet  Erecti 145 MG Oral Tab; Take 1 tablet (145 mg total) by mouth daily. Dispense: 30 tablet; Refill: 1    3. Paronychia of finger of right hand  Mild. Prescription for Bactrim DS. Recommend warm soaks in [place].   Okay to use topical Neosporin.    - Sulfamethoxa meals.   • BusPIRone HCl 5 MG Oral Tab 90 tablet 1     Sig: Take 1 tablet (5 mg total) by mouth every morning. • lisinopril 5 MG Oral Tab 30 tablet 1     Sig: Take 1 tablet (5 mg total) by mouth nightly.    • Sildenafil Citrate (VIAGRA) 100 MG Oral Tab 10

## 2018-12-08 VITALS
SYSTOLIC BLOOD PRESSURE: 124 MMHG | BODY MASS INDEX: 33.69 KG/M2 | WEIGHT: 235.34 LBS | TEMPERATURE: 98 F | DIASTOLIC BLOOD PRESSURE: 82 MMHG | RESPIRATION RATE: 16 BRPM | HEIGHT: 70 IN | HEART RATE: 74 BPM

## 2018-12-12 ENCOUNTER — OFFICE VISIT (OUTPATIENT)
Dept: FAMILY MEDICINE CLINIC | Facility: CLINIC | Age: 43
End: 2018-12-12
Payer: COMMERCIAL

## 2018-12-12 VITALS
SYSTOLIC BLOOD PRESSURE: 120 MMHG | HEART RATE: 98 BPM | DIASTOLIC BLOOD PRESSURE: 82 MMHG | WEIGHT: 233 LBS | BODY MASS INDEX: 33 KG/M2

## 2018-12-12 DIAGNOSIS — E78.5 DYSLIPIDEMIA: ICD-10-CM

## 2018-12-12 DIAGNOSIS — E11.65 TYPE 2 DIABETES MELLITUS WITH HYPERGLYCEMIA, WITHOUT LONG-TERM CURRENT USE OF INSULIN (HCC): Primary | ICD-10-CM

## 2018-12-12 DIAGNOSIS — E78.2 MIXED HYPERLIPIDEMIA: ICD-10-CM

## 2018-12-12 DIAGNOSIS — E78.6 LOW HDL (UNDER 40): ICD-10-CM

## 2018-12-12 PROCEDURE — 99214 OFFICE O/P EST MOD 30 MIN: CPT | Performed by: FAMILY MEDICINE

## 2018-12-12 RX ORDER — FENOFIBRATE 200 MG/1
200 CAPSULE ORAL
Qty: 90 CAPSULE | Refills: 1 | Status: SHIPPED | OUTPATIENT
Start: 2018-12-12 | End: 2019-04-03

## 2018-12-12 NOTE — PROGRESS NOTES
HPI:   Shireen Irizarry is a 37year old male    Type 2 diabetes mellitus:  Uncontrolled. Patient taking glipizide and metformin, denies side effects to medications. Denies hypoglycemia. Denies complications to his diabetes. Patient is not on a statin. Headache January 1991    Sinus   • Hematuria    • Hypertriglyceridemia    • Left flank pain    • Melanocytic nevus    • Mole of skin     left back   • Neoplasm     perioral   • Other and unspecified hyperlipidemia    • Panic disorder without agoraphobia skin diabetic exam is normal, visualized feet and the appearance is normal.  Bilateral monofilament/sensation of both feet is normal.  Pulsation pedal pulse exam of both lower legs/feet is normal as well.   PSYCH:  Affect normal, normal rate of speech Encounter      CMP in 3 months      Lipid in 3 months      Hemoglobin A1C in 3 months      Microalb/Creat Ratio, Random Urine [E]      Meds & Refills for this Visit:  Requested Prescriptions     Signed Prescriptions Disp Refills   • fenofibrate micronized

## 2019-01-22 ENCOUNTER — TELEPHONE (OUTPATIENT)
Dept: FAMILY MEDICINE CLINIC | Facility: CLINIC | Age: 44
End: 2019-01-22

## 2019-01-22 RX ORDER — BUSPIRONE HYDROCHLORIDE 7.5 MG/1
7.5 TABLET ORAL DAILY
Qty: 90 TABLET | Refills: 0 | Status: SHIPPED | OUTPATIENT
Start: 2019-01-22 | End: 2019-04-03

## 2019-01-22 NOTE — TELEPHONE ENCOUNTER
Patrica Madrid is calling for a refill on his BusPIRone HCl 5 MG Oral Tab his [facility] pharmacy on [address] has sent a couple of request but has not heard back from the office, he only has 3 left and would like the office to call [facility] at [phone] to call in a r

## 2019-01-26 DIAGNOSIS — E11.65 TYPE 2 DIABETES MELLITUS WITH HYPERGLYCEMIA, WITHOUT LONG-TERM CURRENT USE OF INSULIN (HCC): ICD-10-CM

## 2019-01-28 RX ORDER — LISINOPRIL 5 MG/1
TABLET ORAL
Qty: 30 TABLET | Refills: 1 | Status: SHIPPED | OUTPATIENT
Start: 2019-01-28 | End: 2019-04-03

## 2019-01-28 NOTE — TELEPHONE ENCOUNTER
rx approved qty 30 + 1 refill per protocol  Patient due for f/u 3/12/19  Future Appointments   Date Time Provider Coleman Taylor   3/13/2019  4:30 PM Maya Umana DO [facility] [address]

## 2019-02-27 ENCOUNTER — NURSE ONLY (OUTPATIENT)
Dept: FAMILY MEDICINE CLINIC | Facility: CLINIC | Age: 44
End: 2019-02-27
Payer: COMMERCIAL

## 2019-02-27 VITALS
TEMPERATURE: 98 F | SYSTOLIC BLOOD PRESSURE: 142 MMHG | WEIGHT: 235 LBS | DIASTOLIC BLOOD PRESSURE: 70 MMHG | HEART RATE: 94 BPM | HEIGHT: 70 IN | BODY MASS INDEX: 33.64 KG/M2 | RESPIRATION RATE: 20 BRPM | OXYGEN SATURATION: 98 %

## 2019-02-27 DIAGNOSIS — J06.9 VIRAL UPPER RESPIRATORY TRACT INFECTION: Primary | ICD-10-CM

## 2019-02-27 DIAGNOSIS — R42 DIZZY: ICD-10-CM

## 2019-02-27 PROCEDURE — 99213 OFFICE O/P EST LOW 20 MIN: CPT | Performed by: NURSE PRACTITIONER

## 2019-02-27 RX ORDER — MECLIZINE HYDROCHLORIDE 25 MG/1
25 TABLET ORAL 3 TIMES DAILY PRN
Qty: 10 TABLET | Refills: 0 | Status: SHIPPED | OUTPATIENT
Start: 2019-02-27 | End: 2019-11-13

## 2019-02-28 NOTE — PROGRESS NOTES
CHIEF COMPLAINT:   Patient presents with:  Ear Problem: left ear clogged, feeling dizziness, post nasal drip, coughing up flem, sinus pressure X 1-2 days     HPI:   Irma Mcgrath is a 37year old male who presents to clinic today with complaints of lef • Bladder cancer [facility])    • Blood in urine December 2015    Bladder Issue   • Calculus of kidney    • Cancer [facility])    • Cephalgia     post trauma   • [facility] January 2013    Type II   • Esophageal reflux    • Family history of diabetes candelaria Right TM: gray, no bulging, no retraction, no effusion, bony landmarks intact. Left TM: gray, no bulging, no retraction,no effusion, bony landmarks intact, +fluid.   NOSE: nostrils patent, no nasal discharge, nasal mucosa pink and noninflamed  THROAT: ora Signed Prescriptions Disp Refills   • Meclizine HCl 25 MG Oral Tab 10 tablet 0     Sig: Take 1 tablet (25 mg total) by mouth 3 (three) times daily as needed. Patient Instructions     Dizziness (Uncertain Cause)  Dizziness is a common symptom.  It · Blood in vomit or stool (black or red color)  · Weakness of an arm or leg or 1 side of the face  · Vision or hearing changes  · Trouble walking or speaking  · Chest, arm, neck, back, or jaw pain  Date Last Reviewed: 11/1/2017  © 5234-1767 [employer] [facility] Syncope is fainting that happens when the brain doesn’t get enough oxygen-rich blood. It can be caused by low heart rate or low blood pressure. This is called vasovagal syncope. It can also be caused by sitting or standing up too quickly.  This is called or

## 2019-02-28 NOTE — PATIENT INSTRUCTIONS
Dizziness (Uncertain Cause)  Dizziness is a common symptom. It may be described as lightheadedness, spinning, or feeling like you are going to faint. Dizziness can have many causes.   Be sure to tell the healthcare provider about:  · All medicines you alfredito Date Last Reviewed: 11/1/2017  © 6964-3891 [employer]. [address] [facility], [address]. All rights reserved. This information is not intended as a substitute for professional medical care.  Always follow your healthcare professional · Medicines. Certain medicines can cause dizziness and even fainting. In some cases, stopping a medicine too quickly can lead to withdrawal symptoms, including dizziness and fainting. · Anxiety.  Being anxious can lead to breathing changes, such as hyperve

## 2019-03-30 ENCOUNTER — APPOINTMENT (OUTPATIENT)
Dept: LAB | Age: 44
End: 2019-03-30
Attending: FAMILY MEDICINE
Payer: COMMERCIAL

## 2019-03-30 DIAGNOSIS — E11.65 TYPE 2 DIABETES MELLITUS WITH HYPERGLYCEMIA, WITHOUT LONG-TERM CURRENT USE OF INSULIN (HCC): ICD-10-CM

## 2019-03-30 DIAGNOSIS — E78.2 MIXED HYPERLIPIDEMIA: ICD-10-CM

## 2019-03-30 DIAGNOSIS — E78.6 LOW HDL (UNDER 40): ICD-10-CM

## 2019-03-30 DIAGNOSIS — E78.5 DYSLIPIDEMIA: ICD-10-CM

## 2019-03-30 PROCEDURE — 82043 UR ALBUMIN QUANTITATIVE: CPT | Performed by: FAMILY MEDICINE

## 2019-03-30 PROCEDURE — 83036 HEMOGLOBIN GLYCOSYLATED A1C: CPT | Performed by: FAMILY MEDICINE

## 2019-03-30 PROCEDURE — 82570 ASSAY OF URINE CREATININE: CPT | Performed by: FAMILY MEDICINE

## 2019-03-30 PROCEDURE — 80061 LIPID PANEL: CPT | Performed by: FAMILY MEDICINE

## 2019-03-30 PROCEDURE — 36415 COLL VENOUS BLD VENIPUNCTURE: CPT | Performed by: FAMILY MEDICINE

## 2019-03-30 PROCEDURE — 80053 COMPREHEN METABOLIC PANEL: CPT | Performed by: FAMILY MEDICINE

## 2019-04-03 ENCOUNTER — OFFICE VISIT (OUTPATIENT)
Dept: FAMILY MEDICINE CLINIC | Facility: CLINIC | Age: 44
End: 2019-04-03
Payer: COMMERCIAL

## 2019-04-03 VITALS
HEART RATE: 100 BPM | HEIGHT: 70 IN | WEIGHT: 234 LBS | BODY MASS INDEX: 33.5 KG/M2 | SYSTOLIC BLOOD PRESSURE: 126 MMHG | DIASTOLIC BLOOD PRESSURE: 78 MMHG

## 2019-04-03 DIAGNOSIS — E11.65 TYPE 2 DIABETES MELLITUS WITH HYPERGLYCEMIA, WITHOUT LONG-TERM CURRENT USE OF INSULIN (HCC): Primary | ICD-10-CM

## 2019-04-03 DIAGNOSIS — F17.210 CIGARETTE SMOKER: ICD-10-CM

## 2019-04-03 DIAGNOSIS — E66.09 CLASS 1 OBESITY DUE TO EXCESS CALORIES WITH SERIOUS COMORBIDITY AND BODY MASS INDEX (BMI) OF 33.0 TO 33.9 IN ADULT: ICD-10-CM

## 2019-04-03 DIAGNOSIS — Z85.51 HISTORY OF BLADDER CANCER: ICD-10-CM

## 2019-04-03 DIAGNOSIS — E78.6 LOW HDL (UNDER 40): ICD-10-CM

## 2019-04-03 DIAGNOSIS — E11.65 TYPE 2 DIABETES MELLITUS WITH HYPERGLYCEMIA, WITHOUT LONG-TERM CURRENT USE OF INSULIN (HCC): ICD-10-CM

## 2019-04-03 DIAGNOSIS — F41.1 GENERALIZED ANXIETY DISORDER: ICD-10-CM

## 2019-04-03 DIAGNOSIS — E78.2 MIXED HYPERLIPIDEMIA: ICD-10-CM

## 2019-04-03 PROCEDURE — 99214 OFFICE O/P EST MOD 30 MIN: CPT | Performed by: FAMILY MEDICINE

## 2019-04-03 RX ORDER — FENOFIBRATE 200 MG/1
200 CAPSULE ORAL
Qty: 90 CAPSULE | Refills: 1 | Status: SHIPPED | OUTPATIENT
Start: 2019-04-03 | End: 2019-11-13

## 2019-04-03 RX ORDER — LISINOPRIL 5 MG/1
5 TABLET ORAL DAILY
Qty: 90 TABLET | Refills: 3 | Status: SHIPPED | OUTPATIENT
Start: 2019-04-03 | End: 2019-11-13

## 2019-04-03 RX ORDER — GLIPIZIDE 10 MG/1
10 TABLET ORAL
Qty: 60 TABLET | Refills: 1 | Status: SHIPPED | OUTPATIENT
Start: 2019-04-03 | End: 2019-07-08

## 2019-04-03 RX ORDER — BUSPIRONE HYDROCHLORIDE 7.5 MG/1
7.5 TABLET ORAL DAILY
Qty: 90 TABLET | Refills: 0 | Status: SHIPPED | OUTPATIENT
Start: 2019-04-03 | End: 2019-07-08 | Stop reason: ALTCHOICE

## 2019-04-03 RX ORDER — LISINOPRIL 5 MG/1
TABLET ORAL
Qty: 90 TABLET | Refills: 0 | Status: SHIPPED | OUTPATIENT
Start: 2019-04-03 | End: 2019-04-03

## 2019-04-03 NOTE — PROGRESS NOTES
HPI:   Yong Mcdonough is a 37year old male    Type 2 diabetes mellitus:  Uncontrolled. Patient taking glipizide and metformin, denies side effects to medications. Denies hypoglycemia. Denies complications to his diabetes. Patient is not on a statin. • Anxiety January 2000   • Back pain January 2014    non chronic   • Bell's palsy     left   • Bladder cancer [facility])    • Blood in urine December 2015    Bladder Issue   • Calculus of kidney    • Cancer [facility])    • Cephalgia     post trauma   • Diabetes me Location: Left arm, Patient Position: Sitting, Cuff Size: adult)   Pulse 100   Ht 70\"   Wt 234 lb   BMI 33.58 kg/m²   GENERAL: well developed, well nourished, in no apparent distress, pleasant obese  male  NECK: supple,no adenopathy  LUNGS: clear 61 (H)    NON HDL CHOL      <130 mg/dL 138 (H) 139 (H)    HEMOGLOBIN A1c      <5.7 % 9.7 (H) 9.4 (H) 8.3 (H)   ESTIMATED AVERAGE GLUCOSE      68 - 126 mg/dL 232 (H) 223 (H) 192 (H)       ASSESSMENT AND PLAN:   Rosibel Loyola is a 37year old male       Ty (under 36)  Patient declines statin. Continue fenofibrate. Recheck lipid panel in 3 months, then follow-up office visit. - LIPID PANEL; Future  - fenofibrate micronized 200 MG Oral Cap;  Take 1 capsule (200 mg total) by mouth every morning before breakfa Outpatient Medications:  lisinopril 5 MG Oral Tab Take 1 tablet (5 mg total) by mouth daily.  Disp: 90 tablet Rfl: 3   fenofibrate micronized 200 MG Oral Cap Take 1 capsule (200 mg total) by mouth every morning before breakfast. Disp: 90 capsule Rfl: 1   gl of complication, not stated as uncontrolled    • Uncomfortable fullness after meals January 2014   • Weight loss       Past Surgical History:   Procedure Laterality Date   • CHOLECYSTECTOMY  2004   • ORAL SURGERY PROCEDURE      [place] [facility]   • TONSILLECTO CREATININE      0.70 - 1.30 mg/dL  0.74    GFR      >=60  112    CALCIUM      8.3 - 10.3 mg/dL  9.3    ALKALINE PHOSPHATASE      45 - 117 U/L  45    AST (SGOT)      15 - 41 U/L  12 (L)    ALT (SGPT)      17 - 63 U/L  36    Total Bilirubin      0.1 - 2.0 appointment. - CBC WITH DIFFERENTIAL WITH PLATELET; Future  - COMP METABOLIC PANEL (14); Future  - LIPID PANEL; Future  - ASSAY, THYROID STIM HORMONE; Future  - FREE T4 (FREE THYROXINE);  Future  - HEMOGLOBIN A1C; Future  - MICROALB/CREAT RATIO, RANDOM U Refill: 1    8. Screening for malignant neoplasm of prostate  - PSA SCREEN; Future    Medication use, risks, benefits, side effects and precautions discussed, patient verbalizes understanding. Questions encouraged and answered to patient's satisfaction. Sig: Take 1 tablet (1,000 mg total) by mouth 2 (two) times daily with meals. • busPIRone HCl 7.5 MG Oral Tab 90 tablet 0     Sig: Take 1 tablet (7.5 mg total) by mouth daily.        Return in about 3 months (around 7/3/2019) for Diabetes and pending test

## 2019-04-03 NOTE — PATIENT INSTRUCTIONS
Please ask Dr. Elio Mayers if he thinks it would be okay for you to start taking Jardiance.   Please call to let Dr. Aydee Rand know either way so additional medication can be added to your current regimen whether it's Jardiance or a different medica your overall health. Your risk of heart attack goes down within one day of putting out that last cigarette. As you go longer without smoking, your risk goes down even more. Quitting isn’t easy, but millions of people have done it. You can, too.  It’s never to your friends and your calendar so that you stay on track. For family and friends  · Be supportive and patient. Quitting smoking can be difficult and stressful. · If you smoke, now’s a great time to quit.  Even if you don’t quit, never smoke around your

## 2019-06-22 ENCOUNTER — APPOINTMENT (OUTPATIENT)
Dept: LAB | Age: 44
End: 2019-06-22
Attending: FAMILY MEDICINE
Payer: COMMERCIAL

## 2019-06-22 DIAGNOSIS — E78.6 LOW HDL (UNDER 40): ICD-10-CM

## 2019-06-22 DIAGNOSIS — E11.65 TYPE 2 DIABETES MELLITUS WITH HYPERGLYCEMIA, WITHOUT LONG-TERM CURRENT USE OF INSULIN (HCC): ICD-10-CM

## 2019-06-22 DIAGNOSIS — E78.2 MIXED HYPERLIPIDEMIA: ICD-10-CM

## 2019-06-22 PROCEDURE — 83036 HEMOGLOBIN GLYCOSYLATED A1C: CPT | Performed by: FAMILY MEDICINE

## 2019-06-22 PROCEDURE — 36415 COLL VENOUS BLD VENIPUNCTURE: CPT | Performed by: FAMILY MEDICINE

## 2019-06-22 PROCEDURE — 80061 LIPID PANEL: CPT | Performed by: FAMILY MEDICINE

## 2019-06-22 PROCEDURE — 84681 ASSAY OF C-PEPTIDE: CPT | Performed by: FAMILY MEDICINE

## 2019-07-08 ENCOUNTER — OFFICE VISIT (OUTPATIENT)
Dept: FAMILY MEDICINE CLINIC | Facility: CLINIC | Age: 44
End: 2019-07-08
Payer: COMMERCIAL

## 2019-07-08 VITALS
BODY MASS INDEX: 31.21 KG/M2 | DIASTOLIC BLOOD PRESSURE: 70 MMHG | HEIGHT: 70 IN | WEIGHT: 218 LBS | HEART RATE: 96 BPM | SYSTOLIC BLOOD PRESSURE: 120 MMHG

## 2019-07-08 DIAGNOSIS — Z79.899 ENCOUNTER FOR LONG-TERM CURRENT USE OF MEDICATION: ICD-10-CM

## 2019-07-08 DIAGNOSIS — E78.6 LOW HDL (UNDER 40): ICD-10-CM

## 2019-07-08 DIAGNOSIS — E78.2 MIXED HYPERLIPIDEMIA: ICD-10-CM

## 2019-07-08 DIAGNOSIS — Z76.89 ENCOUNTER FOR NEW MEDICATION PRESCRIPTION: ICD-10-CM

## 2019-07-08 DIAGNOSIS — E78.5 DYSLIPIDEMIA: ICD-10-CM

## 2019-07-08 DIAGNOSIS — E11.69 TYPE 2 DIABETES MELLITUS WITH OTHER SPECIFIED COMPLICATION, WITHOUT LONG-TERM CURRENT USE OF INSULIN (HCC): Primary | ICD-10-CM

## 2019-07-08 DIAGNOSIS — F41.0 PANIC DISORDER WITHOUT AGORAPHOBIA: ICD-10-CM

## 2019-07-08 DIAGNOSIS — E66.09 CLASS 1 OBESITY DUE TO EXCESS CALORIES WITH SERIOUS COMORBIDITY AND BODY MASS INDEX (BMI) OF 31.0 TO 31.9 IN ADULT: ICD-10-CM

## 2019-07-08 PROCEDURE — 99214 OFFICE O/P EST MOD 30 MIN: CPT | Performed by: FAMILY MEDICINE

## 2019-07-08 RX ORDER — ROSUVASTATIN CALCIUM 5 MG/1
5 TABLET, COATED ORAL NIGHTLY
Qty: 30 TABLET | Refills: 3 | Status: SHIPPED | OUTPATIENT
Start: 2019-07-08 | End: 2019-10-08

## 2019-07-08 RX ORDER — BUSPIRONE HYDROCHLORIDE 5 MG/1
5 TABLET ORAL DAILY
Qty: 90 TABLET | Refills: 3 | Status: SHIPPED | OUTPATIENT
Start: 2019-07-08 | End: 2019-11-13

## 2019-07-08 RX ORDER — GLIPIZIDE 10 MG/1
10 TABLET ORAL
Qty: 60 TABLET | Refills: 1 | Status: SHIPPED | OUTPATIENT
Start: 2019-07-08 | End: 2019-11-13

## 2019-07-08 NOTE — PATIENT INSTRUCTIONS
Try to eat small meals every 3 hours. Recommend lean meats such as skinless chicken breast, 90% lean ground beef, lean ground turkey, pork loin, and any type of fish. Bake, broil or grill. No frying. Avoid cooking in oils.   Okay to use non-s easy to slip back into smoking.  Your plan can help you avoid and recover from relapse.  In any case, start by setting a date to quit within a month, and do it.     Keys to your quit plan  · Talk to your healthcare provider about prescription medicines and 2938-3255 [employer] [address]. [address] [facility], [address] [place] [address]. All rights reserved. This information is not intended as a substitute for professional medical care. Always follow your healthcare professional's instructions.

## 2019-07-08 NOTE — PROGRESS NOTES
HPI:   April Bender is a 37year old male    Type 2 Diabetes:  Greatly improved. Patient has continue to work on his diet. Patient taking glipizide and metformin. Tolerating medications well, no side effects the medications noticed. Not on a statin. trauma   • Diabetes mellitus [facility]) January 2013    Type II   • Esophageal reflux    • Family history of diabetes mellitus    • Generalized anxiety disorder 8/28/2010   • Gross hematuria    • Head trauma     closed   • Headache January 1991    Sinus   • Hem  male  NECK: supple,no adenopathy  LUNGS: clear to auscultation  CARDIO: RRR without murmur  GI: NBS, no masses, HSM or tenderness  EXTREMITIES: no cyanosis, clubbing or edema; 2+ dorsalis pedis pulses  NEURO: Alert and oriented x3  Bilateral bare HEMOGLOBIN A1c      <5.7 % 6.9 (H) 9.7 (H) 9.4 (H)   ESTIMATED AVERAGE GLUCOSE      68 - 126 mg/dL 151 (H) 232 (H) 223 (H)   C-Peptide, Serum Or Plasma      0.8 - 3.5 ng/mL 2.0           ASSESSMENT AND PLAN:   Yong Mcdonough is a 37year old male     T PANEL (7); Future  - LIPID PANEL; Future      5. Panic disorder without agoraphobia  Stable. Continue buspirone. - busPIRone HCl 5 MG Oral Tab; Take 1 tablet (5 mg total) by mouth daily. Dispense: 90 tablet; Refill: 3    6.  Class 1 obesity due to excess times daily with meals. • Rosuvastatin Calcium 5 MG Oral Tab 30 tablet 3     Sig: Take 1 tablet (5 mg total) by mouth nightly.    • Glucose Blood In Vitro Strip 100 strip 3     Sig: TEST 1 to 2 times daily       Return in about 3 months (around 10/8/2019)

## 2019-07-24 PROBLEM — E11.9 DIABETES MELLITUS (HCC): Status: ACTIVE | Noted: 2019-07-24

## 2019-07-24 PROBLEM — Z79.899 ENCOUNTER FOR LONG-TERM CURRENT USE OF MEDICATION: Status: ACTIVE | Noted: 2019-07-24

## 2019-08-31 ENCOUNTER — APPOINTMENT (OUTPATIENT)
Dept: LAB | Age: 44
End: 2019-08-31
Attending: FAMILY MEDICINE
Payer: COMMERCIAL

## 2019-08-31 DIAGNOSIS — E78.2 MIXED HYPERLIPIDEMIA: ICD-10-CM

## 2019-08-31 DIAGNOSIS — E78.5 DYSLIPIDEMIA: ICD-10-CM

## 2019-08-31 DIAGNOSIS — E78.6 LOW HDL (UNDER 40): ICD-10-CM

## 2019-08-31 LAB
ALBUMIN SERPL-MCNC: 4.7 G/DL (ref 3.4–5)
ALP LIVER SERPL-CCNC: 30 U/L (ref 45–117)
ALT SERPL-CCNC: 30 U/L (ref 16–61)
AST SERPL-CCNC: 13 U/L (ref 15–37)
BILIRUB DIRECT SERPL-MCNC: 0.1 MG/DL (ref 0–0.2)
BILIRUB SERPL-MCNC: 0.5 MG/DL (ref 0.1–2)
M PROTEIN MFR SERPL ELPH: 7.7 G/DL (ref 6.4–8.2)

## 2019-08-31 PROCEDURE — 36415 COLL VENOUS BLD VENIPUNCTURE: CPT | Performed by: FAMILY MEDICINE

## 2019-08-31 PROCEDURE — 80076 HEPATIC FUNCTION PANEL: CPT | Performed by: FAMILY MEDICINE

## 2019-10-01 DIAGNOSIS — E78.5 DYSLIPIDEMIA: ICD-10-CM

## 2019-10-01 DIAGNOSIS — E78.6 LOW HDL (UNDER 40): ICD-10-CM

## 2019-10-01 DIAGNOSIS — E78.2 MIXED HYPERLIPIDEMIA: ICD-10-CM

## 2019-10-01 DIAGNOSIS — E11.69 TYPE 2 DIABETES MELLITUS WITH OTHER SPECIFIED COMPLICATION, WITHOUT LONG-TERM CURRENT USE OF INSULIN (HCC): ICD-10-CM

## 2019-10-01 DIAGNOSIS — Z76.89 ENCOUNTER FOR NEW MEDICATION PRESCRIPTION: ICD-10-CM

## 2019-10-01 RX ORDER — ROSUVASTATIN CALCIUM 5 MG/1
TABLET, COATED ORAL
Qty: 90 TABLET | Refills: 1 | OUTPATIENT
Start: 2019-10-01

## 2019-10-08 ENCOUNTER — TELEPHONE (OUTPATIENT)
Dept: FAMILY MEDICINE CLINIC | Facility: CLINIC | Age: 44
End: 2019-10-08

## 2019-10-08 DIAGNOSIS — Z76.89 ENCOUNTER FOR NEW MEDICATION PRESCRIPTION: ICD-10-CM

## 2019-10-08 DIAGNOSIS — E78.6 LOW HDL (UNDER 40): ICD-10-CM

## 2019-10-08 DIAGNOSIS — E78.5 DYSLIPIDEMIA: ICD-10-CM

## 2019-10-08 DIAGNOSIS — E78.2 MIXED HYPERLIPIDEMIA: ICD-10-CM

## 2019-10-08 DIAGNOSIS — E11.69 TYPE 2 DIABETES MELLITUS WITH OTHER SPECIFIED COMPLICATION, WITHOUT LONG-TERM CURRENT USE OF INSULIN (HCC): ICD-10-CM

## 2019-10-08 RX ORDER — ROSUVASTATIN CALCIUM 5 MG/1
5 TABLET, COATED ORAL NIGHTLY
Qty: 30 TABLET | Refills: 0 | Status: SHIPPED | OUTPATIENT
Start: 2019-10-08 | End: 2019-11-13

## 2019-10-08 NOTE — TELEPHONE ENCOUNTER
Requesting refill of rosuvastatin. 30 day refill sent until labs are complete and appointment in November.

## 2019-10-30 DIAGNOSIS — E78.2 MIXED HYPERLIPIDEMIA: ICD-10-CM

## 2019-10-30 DIAGNOSIS — Z76.89 ENCOUNTER FOR NEW MEDICATION PRESCRIPTION: ICD-10-CM

## 2019-10-30 DIAGNOSIS — E11.69 TYPE 2 DIABETES MELLITUS WITH OTHER SPECIFIED COMPLICATION, WITHOUT LONG-TERM CURRENT USE OF INSULIN (HCC): ICD-10-CM

## 2019-10-30 DIAGNOSIS — E78.5 DYSLIPIDEMIA: ICD-10-CM

## 2019-10-30 DIAGNOSIS — E78.6 LOW HDL (UNDER 40): ICD-10-CM

## 2019-10-30 RX ORDER — ROSUVASTATIN CALCIUM 5 MG/1
TABLET, COATED ORAL
Qty: 90 TABLET | Refills: 1 | Status: SHIPPED | OUTPATIENT
Start: 2019-10-30 | End: 2019-11-13

## 2019-10-30 NOTE — TELEPHONE ENCOUNTER
Pt requesting refill of ROSUVASTATIN CALCIUM 5 MG Oral Tab, passed protocol , refill approved, sent to pharmacy

## 2019-11-09 ENCOUNTER — LAB ENCOUNTER (OUTPATIENT)
Dept: LAB | Age: 44
End: 2019-11-09
Attending: FAMILY MEDICINE
Payer: COMMERCIAL

## 2019-11-09 DIAGNOSIS — E11.65 TYPE 2 DIABETES MELLITUS WITH HYPERGLYCEMIA, WITHOUT LONG-TERM CURRENT USE OF INSULIN (HCC): ICD-10-CM

## 2019-11-09 PROCEDURE — 83036 HEMOGLOBIN GLYCOSYLATED A1C: CPT

## 2019-11-09 PROCEDURE — 82043 UR ALBUMIN QUANTITATIVE: CPT

## 2019-11-09 PROCEDURE — 82570 ASSAY OF URINE CREATININE: CPT

## 2019-11-09 PROCEDURE — 36415 COLL VENOUS BLD VENIPUNCTURE: CPT

## 2019-11-09 PROCEDURE — 80061 LIPID PANEL: CPT

## 2019-11-09 PROCEDURE — 80053 COMPREHEN METABOLIC PANEL: CPT

## 2019-11-09 PROCEDURE — 85025 COMPLETE CBC W/AUTO DIFF WBC: CPT

## 2020-07-18 ENCOUNTER — APPOINTMENT (OUTPATIENT)
Dept: LAB | Age: 45
End: 2020-07-18
Attending: FAMILY MEDICINE
Payer: COMMERCIAL

## 2020-07-18 DIAGNOSIS — E11.69 TYPE 2 DIABETES MELLITUS WITH OTHER SPECIFIED COMPLICATION, WITHOUT LONG-TERM CURRENT USE OF INSULIN (HCC): ICD-10-CM

## 2020-07-18 LAB
ALBUMIN SERPL-MCNC: 4.3 G/DL (ref 3.4–5)
ALBUMIN/GLOB SERPL: 1.5 {RATIO} (ref 1–2)
ALP LIVER SERPL-CCNC: 25 U/L (ref 45–117)
ALT SERPL-CCNC: 26 U/L (ref 16–61)
ANION GAP SERPL CALC-SCNC: 7 MMOL/L (ref 0–18)
AST SERPL-CCNC: 11 U/L (ref 15–37)
BILIRUB SERPL-MCNC: 0.5 MG/DL (ref 0.1–2)
BUN BLD-MCNC: 12 MG/DL (ref 7–18)
BUN/CREAT SERPL: 16.2 (ref 10–20)
CALCIUM BLD-MCNC: 9.3 MG/DL (ref 8.5–10.1)
CHLORIDE SERPL-SCNC: 107 MMOL/L (ref 98–112)
CHOLEST SMN-MCNC: 173 MG/DL (ref ?–200)
CO2 SERPL-SCNC: 24 MMOL/L (ref 21–32)
CREAT BLD-MCNC: 0.74 MG/DL (ref 0.7–1.3)
EST. AVERAGE GLUCOSE BLD GHB EST-MCNC: 189 MG/DL (ref 68–126)
GLOBULIN PLAS-MCNC: 2.9 G/DL (ref 2.8–4.4)
GLUCOSE BLD-MCNC: 163 MG/DL (ref 70–99)
HBA1C MFR BLD HPLC: 8.2 % (ref ?–5.7)
HDLC SERPL-MCNC: 22 MG/DL (ref 40–59)
LDLC SERPL CALC-MCNC: 103 MG/DL (ref ?–100)
M PROTEIN MFR SERPL ELPH: 7.2 G/DL (ref 6.4–8.2)
NONHDLC SERPL-MCNC: 151 MG/DL (ref ?–130)
OSMOLALITY SERPL CALC.SUM OF ELEC: 289 MOSM/KG (ref 275–295)
PATIENT FASTING Y/N/NP: YES
PATIENT FASTING Y/N/NP: YES
POTASSIUM SERPL-SCNC: 4 MMOL/L (ref 3.5–5.1)
SODIUM SERPL-SCNC: 138 MMOL/L (ref 136–145)
TRIGL SERPL-MCNC: 242 MG/DL (ref 30–149)
VLDLC SERPL CALC-MCNC: 48 MG/DL (ref 0–30)

## 2020-07-18 PROCEDURE — 80061 LIPID PANEL: CPT

## 2020-07-18 PROCEDURE — 36415 COLL VENOUS BLD VENIPUNCTURE: CPT

## 2020-07-18 PROCEDURE — 80053 COMPREHEN METABOLIC PANEL: CPT

## 2020-07-18 PROCEDURE — 83036 HEMOGLOBIN GLYCOSYLATED A1C: CPT

## (undated) NOTE — MR AVS SNAPSHOT
[facility] [address] [place] [address] [address] [facility]  [place] [address] [id number] [phone]               Thank you for choosing us for your health care visit with Andi Bender DO.   We are glad to serve you and happy to provide you wi tailored diet approach that’s just right for you. He or she will also help you plan healthy meals and snacks. If you have any questions, call your dietitian for advice.     Guidelines for success  Talk with your healthcare provider before starting a diabete · For more information about the best diet plan for you, talk with a registered dietitian (RD). To find an RD in your area, contact:  [address] of [facility] and [address] [place] [address] The American [employer] Association [phone] www. diabetes. org study the nerves in the foot. These include using a small filament (wire) to see how sensitive your feet are. In certain cases, you will be asked to walk a short distance to check for bone, joint, and muscle problems.   Diagnostic tests  If needed, your hea find and treat small skin irritations before they get worse. Regular checkups can also help keep track of the blood flow and feeling in your feet. If you have neuropathy (lack of feeling in your feet), you will need to have checkups more often.   Learn abou Where to Get Your Medications      These medications were sent to [id number]- [place] , [place], [phone]  [address] [place] [address], [place] [id number]     Phone:  [phone]    - Fenofibrate 145 MG Tabs            Results of Rec

## (undated) NOTE — ED AVS SNAPSHOT
Rosibel Loyola   MRN: [id number]    Department:  [facility] [place]   Date of Visit:  11/26/2017           Disclosure     Insurance plans vary and the physician(s) referred by the ER may not be covered by your plan.  Please conta tell this physician (or your personal doctor if your instructions are to return to your personal doctor) about any new or lasting problems. The primary care or specialist physician will see patients referred from the [facility] Emergency Department.  Aftab Smith

## (undated) NOTE — Clinical Note
04/20/2017        [facility] [address]      Dear Varsha Vivas records indicate that you have outstanding lab work and or testing that was ordered for you and has not yet been completed:      Hemoglobin A1C [E]  To p

## (undated) NOTE — MR AVS SNAPSHOT
[address]  [address] [facility] [address] [phone]               Thank you for choosing us for your health care visit with ROBERT Gillette.   We are glad to serve you and happy to provide you with this summary Rinses help keep your sinuses and nose moist. Mix a teaspoon of salt in 8 ounces of fresh, warm water. Use a bulb syringe to gently squirt the water into your nose a few times a day. You can also buy ready-made saline nasal sprays.   Apply hot or cold packs TAKE 1 TABLET (10 MG TOTAL) BY MOUTH 2 (TWO) TIMES DAILY BEFORE MEALS.    Commonly known as:  GLUCOTROL           Glucose Blood Strp   TEST DAILY AND AS NEEDED FOR DIABETES   Commonly known as:  ACCU-CHEK PACO PLUS           MetFORMIN HCl 1000 MG Tabs   TA Eat plenty of protein, keep the fat content low Sugars:  sodas and sports drinks, candies and desserts   Eat plenty of low-fat dairy products High fat meats and dairy   Choose whole grain products Foods high in sodium   Water is best for hydration Fast raya